# Patient Record
Sex: MALE | Race: BLACK OR AFRICAN AMERICAN | Employment: OTHER | URBAN - METROPOLITAN AREA
[De-identification: names, ages, dates, MRNs, and addresses within clinical notes are randomized per-mention and may not be internally consistent; named-entity substitution may affect disease eponyms.]

---

## 2019-04-12 ENCOUNTER — HOSPITAL ENCOUNTER (EMERGENCY)
Age: 64
Discharge: HOME | End: 2019-04-12
Attending: EMERGENCY MEDICINE
Payer: COMMERCIAL

## 2019-04-12 VITALS
TEMPERATURE: 98.6 F | OXYGEN SATURATION: 100 % | HEART RATE: 85 BPM | WEIGHT: 280 LBS | BODY MASS INDEX: 42.44 KG/M2 | SYSTOLIC BLOOD PRESSURE: 178 MMHG | RESPIRATION RATE: 18 BRPM | DIASTOLIC BLOOD PRESSURE: 99 MMHG | HEIGHT: 68 IN

## 2019-04-12 DIAGNOSIS — F19.11 HISTORY OF DRUG ABUSE (HCC): ICD-10-CM

## 2019-04-12 DIAGNOSIS — F17.210 CIGARETTE NICOTINE DEPENDENCE WITHOUT COMPLICATION: ICD-10-CM

## 2019-04-12 DIAGNOSIS — F11.20 METHADONE MAINTENANCE THERAPY PATIENT (HCC): ICD-10-CM

## 2019-04-12 DIAGNOSIS — I10 ESSENTIAL HYPERTENSION: Primary | ICD-10-CM

## 2019-04-12 LAB
ALBUMIN SERPL-MCNC: 3.4 G/DL (ref 3.4–5)
ALBUMIN/GLOB SERPL: 0.9 {RATIO} (ref 0.8–1.7)
ALP SERPL-CCNC: 143 U/L (ref 45–117)
ALT SERPL-CCNC: 23 U/L (ref 16–61)
ANION GAP SERPL CALC-SCNC: 6 MMOL/L (ref 3–18)
AST SERPL-CCNC: 17 U/L (ref 15–37)
BASOPHILS # BLD: 0 K/UL (ref 0–0.1)
BASOPHILS NFR BLD: 0 % (ref 0–2)
BILIRUB SERPL-MCNC: 0.5 MG/DL (ref 0.2–1)
BUN SERPL-MCNC: 16 MG/DL (ref 7–18)
BUN/CREAT SERPL: 15 (ref 12–20)
CALCIUM SERPL-MCNC: 8.5 MG/DL (ref 8.5–10.1)
CHLORIDE SERPL-SCNC: 107 MMOL/L (ref 100–108)
CK MB CFR SERPL CALC: 1.4 % (ref 0–4)
CK MB SERPL-MCNC: 1.8 NG/ML (ref 5–25)
CK SERPL-CCNC: 126 U/L (ref 39–308)
CO2 SERPL-SCNC: 27 MMOL/L (ref 21–32)
CREAT SERPL-MCNC: 1.04 MG/DL (ref 0.6–1.3)
DIFFERENTIAL METHOD BLD: ABNORMAL
EOSINOPHIL # BLD: 0.1 K/UL (ref 0–0.4)
EOSINOPHIL NFR BLD: 2 % (ref 0–5)
ERYTHROCYTE [DISTWIDTH] IN BLOOD BY AUTOMATED COUNT: 14.6 % (ref 11.6–14.5)
GLOBULIN SER CALC-MCNC: 3.6 G/DL (ref 2–4)
GLUCOSE SERPL-MCNC: 102 MG/DL (ref 74–99)
HCT VFR BLD AUTO: 37.2 % (ref 36–48)
HGB BLD-MCNC: 11.2 G/DL (ref 13–16)
LYMPHOCYTES # BLD: 1.6 K/UL (ref 0.9–3.6)
LYMPHOCYTES NFR BLD: 31 % (ref 21–52)
MCH RBC QN AUTO: 24.6 PG (ref 24–34)
MCHC RBC AUTO-ENTMCNC: 30.1 G/DL (ref 31–37)
MCV RBC AUTO: 81.6 FL (ref 74–97)
MONOCYTES # BLD: 0.5 K/UL (ref 0.05–1.2)
MONOCYTES NFR BLD: 10 % (ref 3–10)
NEUTS SEG # BLD: 2.9 K/UL (ref 1.8–8)
NEUTS SEG NFR BLD: 57 % (ref 40–73)
PLATELET # BLD AUTO: 270 K/UL (ref 135–420)
PMV BLD AUTO: 8.7 FL (ref 9.2–11.8)
POTASSIUM SERPL-SCNC: 4.3 MMOL/L (ref 3.5–5.5)
PROT SERPL-MCNC: 7 G/DL (ref 6.4–8.2)
RBC # BLD AUTO: 4.56 M/UL (ref 4.7–5.5)
SODIUM SERPL-SCNC: 140 MMOL/L (ref 136–145)
TROPONIN I SERPL-MCNC: <0.02 NG/ML (ref 0–0.04)
WBC # BLD AUTO: 5.1 K/UL (ref 4.6–13.2)

## 2019-04-12 PROCEDURE — 80053 COMPREHEN METABOLIC PANEL: CPT

## 2019-04-12 PROCEDURE — 99284 EMERGENCY DEPT VISIT MOD MDM: CPT

## 2019-04-12 PROCEDURE — 74011250637 HC RX REV CODE- 250/637: Performed by: EMERGENCY MEDICINE

## 2019-04-12 PROCEDURE — 93005 ELECTROCARDIOGRAM TRACING: CPT

## 2019-04-12 PROCEDURE — 82550 ASSAY OF CK (CPK): CPT

## 2019-04-12 PROCEDURE — 85025 COMPLETE CBC W/AUTO DIFF WBC: CPT

## 2019-04-12 RX ORDER — METHADONE HYDROCHLORIDE 10 MG/1
60 TABLET ORAL DAILY
COMMUNITY

## 2019-04-12 RX ORDER — HYDROCHLOROTHIAZIDE 25 MG/1
25 TABLET ORAL DAILY
Qty: 30 TAB | Refills: 0 | Status: SHIPPED | OUTPATIENT
Start: 2019-04-12 | End: 2019-05-12

## 2019-04-12 RX ORDER — HYDROCHLOROTHIAZIDE 25 MG/1
25 TABLET ORAL
Status: COMPLETED | OUTPATIENT
Start: 2019-04-12 | End: 2019-04-12

## 2019-04-12 RX ADMIN — HYDROCHLOROTHIAZIDE 25 MG: 25 TABLET ORAL at 09:54

## 2019-04-12 NOTE — DISCHARGE INSTRUCTIONS
You were seen and evaluated in the Emergency Department. Please understand that your work up is not all encompassing and you should follow up with your primary care physician for further management and continuity of care. Please return to Emergency Department or seek medical attention immediately if you have acute worsening in your symptoms or develop chest pain, shortness of breath, repeated vomiting, fever, altered level of consciousness, coughing up blood, or start sweating and feel clammy. If you were prescribed any medicine for home, please take as prescribed by your health-care provider. If you were given any follow-up appointments or numbers to call, please do so as instructed. Avoid any tobacco products or excessive alcohol. Patient Education        High Blood Pressure: Care Instructions  Overview    It's normal for blood pressure to go up and down throughout the day. But if it stays up, you have high blood pressure. Another name for high blood pressure is hypertension. Despite what a lot of people think, high blood pressure usually doesn't cause headaches or make you feel dizzy or lightheaded. It usually has no symptoms. But it does increase your risk of stroke, heart attack, and other problems. You and your doctor will talk about your risks of these problems based on your blood pressure. Your doctor will give you a goal for your blood pressure. Your goal will be based on your health and your age. Lifestyle changes, such as eating healthy and being active, are always important to help lower blood pressure. You might also take medicine to reach your blood pressure goal.  Follow-up care is a key part of your treatment and safety. Be sure to make and go to all appointments, and call your doctor if you are having problems. It's also a good idea to know your test results and keep a list of the medicines you take. How can you care for yourself at home?   Medical treatment  · If you stop taking your medicine, your blood pressure will go back up. You may take one or more types of medicine to lower your blood pressure. Be safe with medicines. Take your medicine exactly as prescribed. Call your doctor if you think you are having a problem with your medicine. · Talk to your doctor before you start taking aspirin every day. Aspirin can help certain people lower their risk of a heart attack or stroke. But taking aspirin isn't right for everyone, because it can cause serious bleeding. · See your doctor regularly. You may need to see the doctor more often at first or until your blood pressure comes down. · If you are taking blood pressure medicine, talk to your doctor before you take decongestants or anti-inflammatory medicine, such as ibuprofen. Some of these medicines can raise blood pressure. · Learn how to check your blood pressure at home. Lifestyle changes  · Stay at a healthy weight. This is especially important if you put on weight around the waist. Losing even 10 pounds can help you lower your blood pressure. · If your doctor recommends it, get more exercise. Walking is a good choice. Bit by bit, increase the amount you walk every day. Try for at least 30 minutes on most days of the week. You also may want to swim, bike, or do other activities. · Avoid or limit alcohol. Talk to your doctor about whether you can drink any alcohol. · Try to limit how much sodium you eat to less than 2,300 milligrams (mg) a day. Your doctor may ask you to try to eat less than 1,500 mg a day. · Eat plenty of fruits (such as bananas and oranges), vegetables, legumes, whole grains, and low-fat dairy products. · Lower the amount of saturated fat in your diet. Saturated fat is found in animal products such as milk, cheese, and meat. Limiting these foods may help you lose weight and also lower your risk for heart disease. · Do not smoke. Smoking increases your risk for heart attack and stroke.  If you need help quitting, talk to your doctor about stop-smoking programs and medicines. These can increase your chances of quitting for good. When should you call for help? Call 911 anytime you think you may need emergency care. This may mean having symptoms that suggest that your blood pressure is causing a serious heart or blood vessel problem. Your blood pressure may be over 180/120.   For example, call 911 if:    · You have symptoms of a heart attack. These may include:  ? Chest pain or pressure, or a strange feeling in the chest.  ? Sweating. ? Shortness of breath. ? Nausea or vomiting. ? Pain, pressure, or a strange feeling in the back, neck, jaw, or upper belly or in one or both shoulders or arms. ? Lightheadedness or sudden weakness. ? A fast or irregular heartbeat.     · You have symptoms of a stroke. These may include:  ? Sudden numbness, tingling, weakness, or loss of movement in your face, arm, or leg, especially on only one side of your body. ? Sudden vision changes. ? Sudden trouble speaking. ? Sudden confusion or trouble understanding simple statements. ? Sudden problems with walking or balance. ? A sudden, severe headache that is different from past headaches.     · You have severe back or belly pain.    Do not wait until your blood pressure comes down on its own. Get help right away.   Call your doctor now or seek immediate care if:    · Your blood pressure is much higher than normal (such as 180/120 or higher), but you don't have symptoms.     · You think high blood pressure is causing symptoms, such as:  ? Severe headache.  ? Blurry vision.    Watch closely for changes in your health, and be sure to contact your doctor if:    · Your blood pressure measures higher than your doctor recommends at least 2 times. That means the top number is higher or the bottom number is higher, or both.     · You think you may be having side effects from your blood pressure medicine. Where can you learn more?   Go to http://emily-rosa elena.info/. Enter W248 in the search box to learn more about \"High Blood Pressure: Care Instructions. \"  Current as of: July 22, 2018  Content Version: 11.9  © 9998-0214 [employer]. Care instructions adapted under license by [employer] (which disclaims liability or warranty for this information). If you have questions about a medical condition or this instruction, always ask your healthcare professional. [place] [address] any warranty or liability for your use of this information.

## 2019-04-12 NOTE — ED PROVIDER NOTES
EMERGENCY DEPARTMENT HISTORY AND PHYSICAL EXAM 
 
Date: 4/12/2019 Patient Name: Katelyn Tsang History of Presenting Illness Chief Complaint Patient presents with  Hypertension History Provided By: Patient Additional History (Context): Katelyn Tsang is a 62 y.o. male with PMHX significant for nicotine dependence, drug abuse currently on methadone presents to the emergency department who presents from his methadone clinic for high blood pressure. Patient reports that he was supposed to receive his dose of methadone however referred here because his blood pressure in the office was in the 200s. Patient states that he was told in the past that he had high blood pressure however never followed up with her primary care doctor and never started on antihypertensives. Patient reports intermittent chest pain after eating and shortness of breath on exertion ongoing for the last year. However states that he is currently asymptomatic. Pt denies recent illness including fever, chills, headache, abdominal pain, nausea, vomiting, vision changes, and any other sxs or complaints. PCP: None Current Outpatient Medications Medication Sig Dispense Refill  methadone (DOLOPHINE) 10 mg tablet Take 60 mg by mouth daily.  hydroCHLOROthiazide (HYDRODIURIL) 25 mg tablet Take 1 Tab by mouth daily for 30 days. 30 Tab 0 Past History Past Medical History: 
Past Medical History:  
Diagnosis Date  
 HTN (hypertension) Past Surgical History: 
History reviewed. No pertinent surgical history. Family History: 
History reviewed. No pertinent family history. Social History: 
Social History Tobacco Use  Smoking status: Current Every Day Smoker  Smokeless tobacco: Former User Substance Use Topics  Alcohol use: Never Frequency: Never  Drug use: Not on file Allergies: 
No Known Allergies Review of Systems Review of Systems Constitutional: Negative for chills and fever. HENT: Negative for congestion, ear pain, sinus pain and sore throat. Eyes: Negative for pain and visual disturbance. Respiratory: Positive for shortness of breath. Negative for cough. Cardiovascular: Positive for chest pain. Negative for leg swelling. Gastrointestinal: Negative for abdominal pain, constipation, diarrhea, nausea and vomiting. Genitourinary: Negative for dysuria and hematuria. Musculoskeletal: Negative for back pain and neck pain. Skin: Negative for pallor and rash. Neurological: Negative for tremors, seizures, weakness, light-headedness, numbness and headaches. Facial asymmetry: .now. All other systems reviewed and are negative. Physical Exam  
 
Vitals:  
 04/12/19 0944 04/12/19 1036 BP: (!) 188/129 (!) 178/99 Pulse: 79 85 Resp: 14 18 Temp: 98.6 °F (37 °C) SpO2: 100% 100% Weight: 127 kg (280 lb) Height: 5' 8\" (1.727 m) Physical Exam 
 
Nursing note and vitals reviewed Constitutional: Obese elderly male, no acute distress, hypertensive Head: Normocephalic, Atraumatic Eyes: Pupils are equal, round, and reactive to light, EOMI Neck: Supple, non-tender Cardiovascular: Regular rate and rhythm, no murmurs, rubs, or gallops Chest: Normal work of breathing and chest excursion bilaterally Lungs: Clear to ausculation bilaterally, no wheezes, no rhonchi Abdomen: Soft, non tender, non distended, normoactive bowel sounds Back: No evidence of trauma or deformity Extremities: No evidence of trauma or deformity, no LE edema Skin: Warm and dry, normal cap refill Neuro: Alert and appropriate, CN intact, normal speech, moving all 4 extremities freely and symmetrically Psychiatric: Normal mood and affect Diagnostic Study Results Labs - Recent Results (from the past 12 hour(s)) EKG, 12 LEAD, INITIAL Collection Time: 04/12/19  9:56 AM  
Result Value Ref Range  Ventricular Rate 68 BPM  
 Atrial Rate 68 BPM  
 P-R Interval 166 ms  
 QRS Duration 98 ms Q-T Interval 442 ms QTC Calculation (Bezet) 469 ms Calculated P Axis 68 degrees Calculated R Axis -26 degrees Calculated T Axis 93 degrees Diagnosis Normal sinus rhythm Nonspecific T wave abnormality Prolonged QT Abnormal ECG No previous ECGs available CBC WITH AUTOMATED DIFF Collection Time: 04/12/19 10:15 AM  
Result Value Ref Range WBC 5.1 4.6 - 13.2 K/uL  
 RBC 4.56 (L) 4.70 - 5.50 M/uL  
 HGB 11.2 (L) 13.0 - 16.0 g/dL HCT 37.2 36.0 - 48.0 % MCV 81.6 74.0 - 97.0 FL  
 MCH 24.6 24.0 - 34.0 PG  
 MCHC 30.1 (L) 31.0 - 37.0 g/dL  
 RDW 14.6 (H) 11.6 - 14.5 % PLATELET 988 756 - 349 K/uL MPV 8.7 (L) 9.2 - 11.8 FL  
 NEUTROPHILS 57 40 - 73 % LYMPHOCYTES 31 21 - 52 % MONOCYTES 10 3 - 10 % EOSINOPHILS 2 0 - 5 % BASOPHILS 0 0 - 2 %  
 ABS. NEUTROPHILS 2.9 1.8 - 8.0 K/UL  
 ABS. LYMPHOCYTES 1.6 0.9 - 3.6 K/UL  
 ABS. MONOCYTES 0.5 0.05 - 1.2 K/UL  
 ABS. EOSINOPHILS 0.1 0.0 - 0.4 K/UL  
 ABS. BASOPHILS 0.0 0.0 - 0.1 K/UL  
 DF AUTOMATED METABOLIC PANEL, COMPREHENSIVE Collection Time: 04/12/19 10:15 AM  
Result Value Ref Range Sodium 140 136 - 145 mmol/L Potassium 4.3 3.5 - 5.5 mmol/L Chloride 107 100 - 108 mmol/L  
 CO2 27 21 - 32 mmol/L Anion gap 6 3.0 - 18 mmol/L Glucose 102 (H) 74 - 99 mg/dL BUN 16 7.0 - 18 MG/DL Creatinine 1.04 0.6 - 1.3 MG/DL  
 BUN/Creatinine ratio 15 12 - 20 GFR est AA >60 >60 ml/min/1.73m2 GFR est non-AA >60 >60 ml/min/1.73m2 Calcium 8.5 8.5 - 10.1 MG/DL Bilirubin, total 0.5 0.2 - 1.0 MG/DL  
 ALT (SGPT) 23 16 - 61 U/L  
 AST (SGOT) 17 15 - 37 U/L Alk. phosphatase 143 (H) 45 - 117 U/L Protein, total 7.0 6.4 - 8.2 g/dL Albumin 3.4 3.4 - 5.0 g/dL Globulin 3.6 2.0 - 4.0 g/dL A-G Ratio 0.9 0.8 - 1.7 Radiologic Studies - No orders to display CT Results  (Last 48 hours) None CXR Results  (Last 48 hours) None Medical Decision Making I am the first provider for this patient. I reviewed the vital signs, available nursing notes, past medical history, past surgical history, family history and social history. Vital Signs-Reviewed the patient's vital signs. Pulse Oximetry Analysis -100 % on room air EKG interpretation: (Preliminary) 10:06 AM 
Normal sinus rhythm 68 bpm.  QTc 469 ms. No acute ST elevations Records Reviewed: Nursing Notes and Old Medical Records Provider Notes:  
62 y.o. male with untreated hypertension, presenting from his methadone clinic for hypertension at the office. Complains of 1 year of intermittent chest pain with exertion. On exam patient is noted to be hypertensive however does not appear to be in acute distress. He is afebrile and not tachycardic. Will obtain baseline labs and EKG, cardiac enzymes to evaluate for an organ damage. We will give a dose of hydrochlorothiazide. Will be referred to  for PCP follow-up. Labs, EKG is within normal limits, patient will be discharged with 1 month supply of hydrochlorothiazide and urged to follow-up with PCP to help manage his high blood pressure. Procedures: 
Procedures ED Course:  
9:45 AM Initial assessment performed. The patients presenting problems have been discussed, and they are in agreement with the care plan formulated and outlined with them. I have encouraged them to ask questions as they arise throughout their visit. SMOKING CESSATION: 
The patient was counseled on the dangers of tobacco use, and was advised to quit. Reviewed strategies to maximize success, including removing cigarettes and smoking materials from environment. Discussion took 3-5 minutes, and pt expressed understanding. 11:15 AM labs reassuring showing no endorgan damage. Patient has been seen and consulted by . Will set up an appointment for PCP. Patient will be discharged with 1 month supply of hydrochlorothiazide. Diagnosis and Disposition DISCHARGE NOTE: 
11:15 AM 
 
Celestine Alford's  results have been reviewed with him. He has been counseled regarding his diagnosis, treatment, and plan. He verbally conveys understanding and agreement of the signs, symptoms, diagnosis, treatment and prognosis and additionally agrees to follow up as discussed. He also agrees with the care-plan and conveys that all of his questions have been answered. I have also provided discharge instructions for him that include: educational information regarding their diagnosis and treatment, and list of reasons why they would want to return to the ED prior to their follow-up appointment, should his condition change. He has been provided with education for proper emergency department utilization. CLINICAL IMPRESSION: 
 
1. Essential hypertension 2. Cigarette nicotine dependence without complication 3. Methadone maintenance therapy patient ([facility] [address] 4. History of drug abuse PLAN: 
1. D/C Home 2. Current Discharge Medication List  
  
START taking these medications Details  
hydroCHLOROthiazide (HYDRODIURIL) 25 mg tablet Take 1 Tab by mouth daily for 30 days. Qty: 30 Tab, Refills: 0  
  
  
 
3. Follow-up Information Follow up With Specialties Details Why Contact Info [facility]  Schedule an appointment as soon as possible for a visit in 2 days   [address], [address] [address] [phone] [facility] EMERGENCY DEPT Emergency Medicine  As needed if symptoms worsen [address] 
[address] [address] 
[phone]  
  
 
____________________________________ Please note that this dictation was completed with [employer], the [employer] voice recognition software.   Quite often unanticipated grammatical, syntax, homophones, and other interpretive errors are inadvertently transcribed by the computer software. Please disregard these errors. Please excuse any errors that have escaped final proofreading.

## 2019-04-12 NOTE — ED TRIAGE NOTES
Pt ambulated into er with complaints of hypertension, pt states that he was at Dr. Jack Saba office and was told to come over to er due to blood pressure. Pt states he has a history of high blood pressure but does not take medication.

## 2019-05-14 LAB
ATRIAL RATE: 68 BPM
CALCULATED P AXIS, ECG09: 68 DEGREES
CALCULATED R AXIS, ECG10: -26 DEGREES
CALCULATED T AXIS, ECG11: 93 DEGREES
DIAGNOSIS, 93000: NORMAL
P-R INTERVAL, ECG05: 166 MS
Q-T INTERVAL, ECG07: 442 MS
QRS DURATION, ECG06: 98 MS
QTC CALCULATION (BEZET), ECG08: 469 MS
VENTRICULAR RATE, ECG03: 68 BPM

## 2021-02-17 ENCOUNTER — HOSPITAL ENCOUNTER (OUTPATIENT)
Dept: NUTRITION | Age: 66
Discharge: HOME | End: 2021-02-17
Payer: MEDICARE

## 2021-02-17 PROCEDURE — 97802 MEDICAL NUTRITION INDIV IN: CPT

## 2021-02-17 NOTE — PROGRESS NOTES
[address] [place] [address]  [address] [facility], [address] [facility]  Phone: [phone] Fax: [phone]   Nutrition Assessment - Medical Nutrition Therapy   Outpatient Initial Evaluation         Patient Name: Madai Dc : 1955   Treatment Diagnosis: Type 2 Diabetes with Hyperglycemia    Referral Source: Parth Alcazar NP Start of Care [facility]): 2021     Gender: male Age: 73 y.o. Ht: 71 in Wt: 290  lb  kg   BMI: 40.4 BMR   Male  BMR Female      Past Medical History:  Diabetes, High Blood Pressure      Pertinent Medications:   Includes: Metformin, Amlodipine, Aspirin, Atorvastatin, Carvedilol, Cyclobenzaprine, Ibuprofen, Imdur, Lidocaine, Methadone     Biochemical Data:   No results found for: MCACR, MCA1, MCA2, MCA3, MCAU, MCAU2, MCALPOCT     Assessment:   Patient was recently diagnosed with diabetes and is present today to learn about nutrition related to diabetes. Patient lives alone and is responsible for grocery shopping and cooking. Patient reports that he sleeps most of the day/ night; he wakes and sleeps in 2 hour increments. Patient is unable to exercise due to back pain. Food & Nutrition: Breakfast: Chick[employer] chicken biscuit with hash browns  Lunch: frozen spaghetti meal  Dinner: frozen spaghetti meal   Drinks: 2 liters Dr. Danny Chavarria, sweet tea       Estimate Needs   Calories: 2100  Protein: 131 Carbs: 236 Fat: 70   Kcal/day  g/day  g/day  g/day        percent: 25  45  30               Nutrition Diagnosis Obesity related to excessive carbohydrate intake as evidenced by 24 hour recall of carbohydrate dense foods (soda, pasta, sweet tea, fruit juice, fast food). Nutrition Intervention &  Education: Educated patient on the need for a consistent carbohydrate intake related to diabetic control and weight loss. Educated patient on nutrition label reading, emphasizing carbohydrates and serving size.  Educated patient on protein sources, encouraged patient to incorporate mostly lean protein source with all carbohydrates. Encouraged patient to exercise after meals when possible. Handouts Provided: [x]  Carbohydrates  [x]  Protein  [x]  Non-starchy Vegatbles  [x]  Food Label  [x]  Meal and Snack Ideas  []  Food Journals [x]  Diabetes  []  Cholesterol  []  Sodium  []  Gen Nutr Guidelines  []  SBGM Guidelines  []  Others:   Information Reviewed with: Patient   Readiness to Change Stage: [x]  Pre-contemplative    []  Contemplative  []  Preparation               []  Action                  []  Maintenance   Potential Barriers to Learning: []  Decline in memory    []  Language barrier   []  Other:  []  Emotional                  []  Limited mobility  [x]  Lack of motivation     [] Vision, hearing or cognitive impairment   Expected Compliance: Poor     Nutritional Goal - To promote lifestyle changes to result in:    [x]  Weight loss  [x]  Improved diabetic control  []  Decreased cholesterol levels  []  Decreased blood pressure  []  Weight maintenance []  Preventing any interactions associated with food allergies  []  Adequate weight gain toward goal weight  []  Other:        Patient Goals:   1. Patient will have three meals per day 4-5 hours apart. 2. Patient will have a protein at every meal and snack. 3. Patient will always choose calorie free beverages.       Dietitian Signature: Mavis Love RD Date: 2/17/2021   Follow-up:  Time: 11:44 AM

## 2024-04-18 ENCOUNTER — APPOINTMENT (RX ONLY)
Dept: URBAN - METROPOLITAN AREA CLINIC 124 | Facility: CLINIC | Age: 69
Setting detail: DERMATOLOGY
End: 2024-04-18

## 2024-04-18 DIAGNOSIS — D485 NEOPLASM OF UNCERTAIN BEHAVIOR OF SKIN: ICD-10-CM

## 2024-04-18 DIAGNOSIS — L81.4 OTHER MELANIN HYPERPIGMENTATION: ICD-10-CM

## 2024-04-18 DIAGNOSIS — Z71.89 OTHER SPECIFIED COUNSELING: ICD-10-CM

## 2024-04-18 DIAGNOSIS — L82.1 OTHER SEBORRHEIC KERATOSIS: ICD-10-CM

## 2024-04-18 PROBLEM — D48.5 NEOPLASM OF UNCERTAIN BEHAVIOR OF SKIN: Status: ACTIVE | Noted: 2024-04-18

## 2024-04-18 PROCEDURE — ? COUNSELING

## 2024-04-18 PROCEDURE — ? SNIP BIOPSY

## 2024-04-18 PROCEDURE — 99203 OFFICE O/P NEW LOW 30 MIN: CPT | Mod: 25

## 2024-04-18 PROCEDURE — ? SUNSCREEN RECOMMENDATIONS

## 2024-04-18 PROCEDURE — 11103 TANGNTL BX SKIN EA SEP/ADDL: CPT

## 2024-04-18 PROCEDURE — 11102 TANGNTL BX SKIN SINGLE LES: CPT

## 2024-04-18 ASSESSMENT — LOCATION SIMPLE DESCRIPTION DERM
LOCATION SIMPLE: RIGHT AXILLARY VAULT
LOCATION SIMPLE: RIGHT UPPER BACK
LOCATION SIMPLE: LEFT AXILLARY VAULT
LOCATION SIMPLE: RIGHT CHEEK
LOCATION SIMPLE: LEFT UPPER BACK
LOCATION SIMPLE: RIGHT THIGH

## 2024-04-18 ASSESSMENT — LOCATION DETAILED DESCRIPTION DERM
LOCATION DETAILED: RIGHT ANTERIOR PROXIMAL THIGH
LOCATION DETAILED: RIGHT AXILLARY VAULT
LOCATION DETAILED: LEFT SUPERIOR UPPER BACK
LOCATION DETAILED: RIGHT INFERIOR LATERAL MALAR CHEEK
LOCATION DETAILED: LEFT AXILLARY VAULT
LOCATION DETAILED: RIGHT SUPERIOR UPPER BACK

## 2024-04-18 ASSESSMENT — LOCATION ZONE DERM
LOCATION ZONE: LEG
LOCATION ZONE: FACE
LOCATION ZONE: TRUNK
LOCATION ZONE: AXILLAE